# Patient Record
Sex: FEMALE | Race: WHITE | URBAN - METROPOLITAN AREA
[De-identification: names, ages, dates, MRNs, and addresses within clinical notes are randomized per-mention and may not be internally consistent; named-entity substitution may affect disease eponyms.]

---

## 2020-03-04 ENCOUNTER — HOSPITAL ENCOUNTER (OUTPATIENT)
Dept: RADIATION THERAPY | Age: 45
Discharge: HOME | End: 2020-03-04

## 2022-10-24 ENCOUNTER — APPOINTMENT (OUTPATIENT)
Dept: GENERAL RADIOLOGY | Age: 47
End: 2022-10-24
Attending: STUDENT IN AN ORGANIZED HEALTH CARE EDUCATION/TRAINING PROGRAM
Payer: COMMERCIAL

## 2022-10-24 ENCOUNTER — APPOINTMENT (OUTPATIENT)
Dept: CT IMAGING | Age: 47
End: 2022-10-24
Attending: FAMILY MEDICINE
Payer: COMMERCIAL

## 2022-10-24 ENCOUNTER — HOSPITAL ENCOUNTER (OUTPATIENT)
Age: 47
Setting detail: OBSERVATION
Discharge: LEFT BEFORE DISCHARGE | End: 2022-10-25
Attending: STUDENT IN AN ORGANIZED HEALTH CARE EDUCATION/TRAINING PROGRAM | Admitting: FAMILY MEDICINE
Payer: COMMERCIAL

## 2022-10-24 DIAGNOSIS — R55 SYNCOPE AND COLLAPSE: Primary | ICD-10-CM

## 2022-10-24 LAB
ANION GAP SERPL CALC-SCNC: 7 MMOL/L (ref 5–15)
ATRIAL RATE: 78 BPM
BASOPHILS # BLD: 0 K/UL (ref 0–0.1)
BASOPHILS NFR BLD: 0 % (ref 0–1)
BNP SERPL-MCNC: 53 PG/ML
BUN SERPL-MCNC: 30 MG/DL (ref 6–20)
BUN/CREAT SERPL: 22 (ref 12–20)
CA-I BLD-MCNC: 8.5 MG/DL (ref 8.5–10.1)
CALCULATED P AXIS, ECG09: 50 DEGREES
CALCULATED R AXIS, ECG10: -20 DEGREES
CALCULATED T AXIS, ECG11: 44 DEGREES
CHLORIDE SERPL-SCNC: 96 MMOL/L (ref 97–108)
CO2 SERPL-SCNC: 30 MMOL/L (ref 21–32)
COVID-19 RAPID TEST, COVR: NOT DETECTED
CREAT SERPL-MCNC: 1.36 MG/DL (ref 0.55–1.02)
DIAGNOSIS, 93000: NORMAL
DIFFERENTIAL METHOD BLD: ABNORMAL
EOSINOPHIL # BLD: 0.1 K/UL (ref 0–0.4)
EOSINOPHIL NFR BLD: 1 % (ref 0–7)
ERYTHROCYTE [DISTWIDTH] IN BLOOD BY AUTOMATED COUNT: 21.2 % (ref 11.5–14.5)
GLUCOSE SERPL-MCNC: 190 MG/DL (ref 65–100)
HCT VFR BLD AUTO: 31.1 % (ref 35–47)
HGB BLD-MCNC: 10 G/DL (ref 11.5–16)
IMM GRANULOCYTES # BLD AUTO: 0 K/UL
IMM GRANULOCYTES NFR BLD AUTO: 0 %
LYMPHOCYTES # BLD: 2.2 K/UL (ref 0.8–3.5)
LYMPHOCYTES NFR BLD: 25 % (ref 12–49)
MAGNESIUM SERPL-MCNC: 1.8 MG/DL (ref 1.6–2.4)
MCH RBC QN AUTO: 30.3 PG (ref 26–34)
MCHC RBC AUTO-ENTMCNC: 32.2 G/DL (ref 30–36.5)
MCV RBC AUTO: 94.2 FL (ref 80–99)
METAMYELOCYTES NFR BLD MANUAL: 2 %
MONOCYTES # BLD: 0.3 K/UL (ref 0–1)
MONOCYTES NFR BLD: 4 % (ref 5–13)
MRSA DNA SPEC QL NAA+PROBE: NOT DETECTED
NEUTS SEG # BLD: 5.8 K/UL (ref 1.8–8)
NEUTS SEG NFR BLD: 68 % (ref 32–75)
NRBC # BLD: 0 K/UL (ref 0–0.01)
NRBC BLD-RTO: 0 PER 100 WBC
P-R INTERVAL, ECG05: 148 MS
PHOSPHATE SERPL-MCNC: 4.1 MG/DL (ref 2.6–4.7)
PLATELET # BLD AUTO: 277 K/UL (ref 150–400)
PMV BLD AUTO: 10.5 FL (ref 8.9–12.9)
POTASSIUM SERPL-SCNC: 3.1 MMOL/L (ref 3.5–5.1)
Q-T INTERVAL, ECG07: 368 MS
QRS DURATION, ECG06: 82 MS
QTC CALCULATION (BEZET), ECG08: 419 MS
RBC # BLD AUTO: 3.3 M/UL (ref 3.8–5.2)
RBC MORPH BLD: ABNORMAL
SODIUM SERPL-SCNC: 133 MMOL/L (ref 136–145)
TROPONIN-HIGH SENSITIVITY: 10 NG/L (ref 0–51)
VENTRICULAR RATE, ECG03: 78 BPM
WBC # BLD AUTO: 8.6 K/UL (ref 3.6–11)

## 2022-10-24 PROCEDURE — 36415 COLL VENOUS BLD VENIPUNCTURE: CPT

## 2022-10-24 PROCEDURE — G0378 HOSPITAL OBSERVATION PER HR: HCPCS

## 2022-10-24 PROCEDURE — 93005 ELECTROCARDIOGRAM TRACING: CPT

## 2022-10-24 PROCEDURE — 90714 TD VACC NO PRESV 7 YRS+ IM: CPT | Performed by: STUDENT IN AN ORGANIZED HEALTH CARE EDUCATION/TRAINING PROGRAM

## 2022-10-24 PROCEDURE — 80048 BASIC METABOLIC PNL TOTAL CA: CPT

## 2022-10-24 PROCEDURE — 70450 CT HEAD/BRAIN W/O DYE: CPT

## 2022-10-24 PROCEDURE — 87635 SARS-COV-2 COVID-19 AMP PRB: CPT

## 2022-10-24 PROCEDURE — 96360 HYDRATION IV INFUSION INIT: CPT

## 2022-10-24 PROCEDURE — 85025 COMPLETE CBC W/AUTO DIFF WBC: CPT

## 2022-10-24 PROCEDURE — 99285 EMERGENCY DEPT VISIT HI MDM: CPT

## 2022-10-24 PROCEDURE — 90471 IMMUNIZATION ADMIN: CPT

## 2022-10-24 PROCEDURE — 74011250636 HC RX REV CODE- 250/636: Performed by: STUDENT IN AN ORGANIZED HEALTH CARE EDUCATION/TRAINING PROGRAM

## 2022-10-24 PROCEDURE — 87641 MR-STAPH DNA AMP PROBE: CPT

## 2022-10-24 PROCEDURE — 87040 BLOOD CULTURE FOR BACTERIA: CPT

## 2022-10-24 PROCEDURE — 96372 THER/PROPH/DIAG INJ SC/IM: CPT

## 2022-10-24 PROCEDURE — 65270000029 HC RM PRIVATE

## 2022-10-24 PROCEDURE — 83880 ASSAY OF NATRIURETIC PEPTIDE: CPT

## 2022-10-24 PROCEDURE — 74011250637 HC RX REV CODE- 250/637: Performed by: FAMILY MEDICINE

## 2022-10-24 PROCEDURE — 73562 X-RAY EXAM OF KNEE 3: CPT

## 2022-10-24 PROCEDURE — 84100 ASSAY OF PHOSPHORUS: CPT

## 2022-10-24 PROCEDURE — 83735 ASSAY OF MAGNESIUM: CPT

## 2022-10-24 PROCEDURE — 84484 ASSAY OF TROPONIN QUANT: CPT

## 2022-10-24 PROCEDURE — 74011250636 HC RX REV CODE- 250/636: Performed by: FAMILY MEDICINE

## 2022-10-24 RX ORDER — HEPARIN SODIUM 5000 [USP'U]/ML
5000 INJECTION, SOLUTION INTRAVENOUS; SUBCUTANEOUS EVERY 8 HOURS
Status: DISCONTINUED | OUTPATIENT
Start: 2022-10-24 | End: 2022-10-25 | Stop reason: HOSPADM

## 2022-10-24 RX ORDER — ACETAMINOPHEN 325 MG/1
650 TABLET ORAL
Status: DISCONTINUED | OUTPATIENT
Start: 2022-10-24 | End: 2022-10-25 | Stop reason: HOSPADM

## 2022-10-24 RX ORDER — ONDANSETRON 2 MG/ML
4 INJECTION INTRAMUSCULAR; INTRAVENOUS
Status: DISCONTINUED | OUTPATIENT
Start: 2022-10-24 | End: 2022-10-25 | Stop reason: HOSPADM

## 2022-10-24 RX ORDER — POLYETHYLENE GLYCOL 3350 17 G/17G
17 POWDER, FOR SOLUTION ORAL DAILY PRN
Status: DISCONTINUED | OUTPATIENT
Start: 2022-10-24 | End: 2022-10-25 | Stop reason: HOSPADM

## 2022-10-24 RX ORDER — ACETAMINOPHEN 650 MG/1
650 SUPPOSITORY RECTAL
Status: DISCONTINUED | OUTPATIENT
Start: 2022-10-24 | End: 2022-10-25 | Stop reason: HOSPADM

## 2022-10-24 RX ORDER — SODIUM CHLORIDE 9 MG/ML
75 INJECTION, SOLUTION INTRAVENOUS CONTINUOUS
Status: DISCONTINUED | OUTPATIENT
Start: 2022-10-24 | End: 2022-10-25 | Stop reason: HOSPADM

## 2022-10-24 RX ORDER — POTASSIUM CHLORIDE 750 MG/1
40 TABLET, FILM COATED, EXTENDED RELEASE ORAL
Status: COMPLETED | OUTPATIENT
Start: 2022-10-24 | End: 2022-10-24

## 2022-10-24 RX ORDER — ONDANSETRON 4 MG/1
4 TABLET, ORALLY DISINTEGRATING ORAL
Status: DISCONTINUED | OUTPATIENT
Start: 2022-10-24 | End: 2022-10-25 | Stop reason: HOSPADM

## 2022-10-24 RX ADMIN — TETANUS AND DIPHTHERIA TOXOIDS ADSORBED 0.5 ML: 2; 2 INJECTION INTRAMUSCULAR at 17:07

## 2022-10-24 RX ADMIN — SODIUM CHLORIDE 75 ML/HR: 9 INJECTION, SOLUTION INTRAVENOUS at 23:00

## 2022-10-24 RX ADMIN — SODIUM CHLORIDE 1000 ML: 9 INJECTION, SOLUTION INTRAVENOUS at 17:07

## 2022-10-24 RX ADMIN — POTASSIUM CHLORIDE 40 MEQ: 750 TABLET, FILM COATED, EXTENDED RELEASE ORAL at 22:45

## 2022-10-24 RX ADMIN — HEPARIN SODIUM 5000 UNITS: 5000 INJECTION INTRAVENOUS; SUBCUTANEOUS at 22:30

## 2022-10-24 NOTE — ED TRIAGE NOTES
Generalized weakness, recently on chemo, had syncopal episode at doctor office ending in fall, admits to loc but denies hitting head.

## 2022-10-24 NOTE — ED PROVIDER NOTES
[facility]  EMERGENCY DEPARTMENT ENCOUNTER NOTE        Date: 10/24/2022  Patient Name: Renate Turcios      History of Presenting Illness     Chief Complaint   Patient presents with    Fall    Syncope       History Provided By: Patient    HPI: Renate Turcios, 49 y.o. female with history of stage IV endometrial cancer currently receiving chemotherapy, hypertension, peripheral neuropathy, and venous stasis has been having dizziness for the last week. Today she had 2 episodes of dizziness 1 of which did not remember falling. She denies head trauma. She reports that she fell on the left knee resulting in an abrasion and pain at the knee. She is denying any chest pain, shortness of breath, nausea, vomiting, or diaphoresis. No recent illnesses. She is pain reporting that she is trying to stay hydrated and eat adequate amount of food. Presenting issue is moderate severity, known trigger, aggravating leaving factors without/additional symptoms. Currently denying any dizziness, back pain, neck pain, chest pain, abdominal pain, or musculoskeletal pain. She denies any trauma to any other part of her body aside from her left knee. There are no other complaints, changes, or physical findings at this time. PCP: Jaquelin Guadarrama MD        Past History     Past Medical History:  Past Medical History:   Diagnosis Date    Endometrial cancer ([address]     HTN (hypertension)     Peripheral neuropathy     Venous stasis        Past Surgical History:  Past Surgical History:   Procedure Laterality Date    HX CHOLECYSTECTOMY      HX DILATION AND CURETTAGE      HX HYSTERECTOMY         Family History:  History reviewed. No pertinent family history. Social History:  Social History     Tobacco Use    Smoking status: Never    Smokeless tobacco: Never   Substance Use Topics    Alcohol use: Not Currently    Drug use: Never       Allergies:   Allergies   Allergen Reactions    Keflex [Cephalexin] Other (comments)         Review of Systems     Review of Systems    A 10 point review of system was performed and was negative except as noted above in HPI    Physical Exam     Physical Exam  Vitals and nursing note reviewed. Constitutional:       General: She is not in acute distress. Appearance: She is well-developed. She is not diaphoretic. HENT:      Head: Normocephalic and atraumatic. Eyes:      Extraocular Movements: Extraocular movements intact. Conjunctiva/sclera: Conjunctivae normal.   Cardiovascular:      Rate and Rhythm: Normal rate and regular rhythm. Heart sounds: Normal heart sounds. Pulmonary:      Effort: Pulmonary effort is normal.      Breath sounds: Normal breath sounds. Abdominal:      Palpations: Abdomen is soft. Tenderness: There is no abdominal tenderness. Musculoskeletal:      Cervical back: Neck supple. Right lower leg: No tenderness. No edema. Left lower leg: No tenderness. No edema. Neurological:      General: No focal deficit present. Mental Status: She is alert and oriented to person, place, and time. Lab and Diagnostic Study Results     Labs -     Recent Results (from the past 12 hour(s))   CBC WITH AUTOMATED DIFF    Collection Time: 10/24/22  4:34 PM   Result Value Ref Range    WBC 8.6 3.6 - 11.0 K/uL    RBC 3.30 (L) 3.80 - 5.20 M/uL    HGB 10.0 (L) 11.5 - 16.0 g/dL    HCT 31.1 (L) 35.0 - 47.0 %    MCV 94.2 80.0 - 99.0 FL    MCH 30.3 26.0 - 34.0 PG    MCHC 32.2 30.0 - 36.5 g/dL    RDW 21.2 (H) 11.5 - 14.5 %    PLATELET 148 179 - 414 K/uL    MPV 10.5 8.9 - 12.9 FL    NRBC 0.0 0.0  WBC    ABSOLUTE NRBC 0.00 0.00 - 0.01 K/uL    NEUTROPHILS 68 32 - 75 %    LYMPHOCYTES 25 12 - 49 %    MONOCYTES 4 (L) 5 - 13 %    EOSINOPHILS 1 0 - 7 %    BASOPHILS 0 0 - 1 %    METAMYELOCYTES 2 (H) 0 %    IMMATURE GRANULOCYTES 0 %    ABS. NEUTROPHILS 5.8 1.8 - 8.0 K/UL    ABS. LYMPHOCYTES 2.2 0.8 - 3.5 K/UL    ABS.  MONOCYTES 0.3 0.0 - 1.0 K/UL ABS. EOSINOPHILS 0.1 0.0 - 0.4 K/UL    ABS. BASOPHILS 0.0 0.0 - 0.1 K/UL    ABS. IMM. GRANS. 0.0 K/UL    DF Manual      RBC COMMENTS Anisocytosis  1+        RBC COMMENTS Microcytosis  1+        RBC COMMENTS Spherocytes  1+       METABOLIC PANEL, BASIC    Collection Time: 10/24/22  4:34 PM   Result Value Ref Range    Sodium 133 (L) 136 - 145 mmol/L    Potassium 3.1 (L) 3.5 - 5.1 mmol/L    Chloride 96 (L) 97 - 108 mmol/L    CO2 30 21 - 32 mmol/L    Anion gap 7 5 - 15 mmol/L    Glucose 190 (H) 65 - 100 mg/dL    BUN 30 (H) 6 - 20 mg/dL    Creatinine 1.36 (H) 0.55 - 1.02 mg/dL    BUN/Creatinine ratio 22 (H) 12 - 20      eGFR 48 (L) >60 ml/min/1.73m2    Calcium 8.5 8.5 - 10.1 mg/dL   MAGNESIUM    Collection Time: 10/24/22  4:34 PM   Result Value Ref Range    Magnesium 1.8 1.6 - 2.4 mg/dL   PHOSPHORUS    Collection Time: 10/24/22  4:34 PM   Result Value Ref Range    Phosphorus 4.1 2.6 - 4.7 mg/dL   NT-PRO BNP    Collection Time: 10/24/22  4:34 PM   Result Value Ref Range    NT pro-BNP 53 <125 pg/mL   TROPONIN-HIGH SENSITIVITY    Collection Time: 10/24/22  7:30 PM   Result Value Ref Range    Troponin-High Sensitivity 10 0 - 51 ng/L       Radiologic Studies -   [unfilled]  CT Results  (Last 48 hours)      None          CXR Results  (Last 48 hours)      None            Medical Decision Making and ED Course   - I am the first and primary provider for this patient AND AM THE PRIMARY PROVIDER OF RECORD. - I reviewed the vital signs, available nursing notes, past medical history, past surgical history, family history and social history. - Initial assessment performed. The patients presenting problems have been discussed, and the staff are in agreement with the care plan formulated and outlined with them. I have encouraged them to ask questions as they arise throughout their visit. Vital Signs-Reviewed the patient's vital signs.     Patient Vitals for the past 24 hrs:   Temp Pulse Resp BP SpO2   10/24/22 1710 -- 75 18 96/64 98 %   10/24/22 1528 98.1 °F (36.7 °C) 90 19 98/61 95 %       Records Reviewed: Nursing Notes    Provider Notes (Medical Decision Making):     ED Course as of 10/24/22 2132   Mon Oct 24, 2022   1550 Patient is 48-year-old female who presents to the ED with 2 episodes of syncope. Patient reports that she had a near syncopal episode earlier when she was taking her  to his appointment. And later on developed another syncopal episode without any warning symptoms. She landed her knee and immediately came back to baseline and was awake. She denies any preceding chest pain, shortness of breath, lightheadedness, dizziness, or diaphoresis. Plan is to do an CBC, chemistry, proBNP, and get x-ray of her knee. We will additionally give 1 L of normal saline as well as tetanus booster given that she has an abrasion to her knee. [AA]   1555 KG was done at 3:36 PM entered by me as NSR with sinus arrhythmia. Rate is 78, intervals within normal, left axis deviation, no ST elevation or depression, no signs of dysrhythmia or blocks. Subtle baseline motion artifact. [AA]   [id number] Patient work-up in the ED revealed normal CBC, chemistry within normal, proBNP, phosphorus, magnesium, and troponin all within acceptable range. If her knee did not reveal any signs of fractures. I am concerned about her syncope that appears to me more of cardiogenic in origin. We will admit the patient to the hospital. [AA]      ED Course User Index  [AA] Chai Castellano MD       Diagnosis     Clinical Impression:   1. Syncope and collapse        Disposition     Disposition: Condition stable      Attestations: Reina Wu MD    Please note that this dictation was completed with "[employer] the [employer] voice recognition software. Quite often unanticipated grammatical, syntax, homophones, and other interpretive errors are inadvertently transcribed by the computer software. Please disregard these errors.   Please excuse any errors that have escaped final proofreading. Thank you.

## 2022-10-24 NOTE — Clinical Note
Patient Class[de-identified] OBSERVATION [104]   Type of Bed: Telemetry [19]   Cardiac Monitoring Required?: Yes   Reason for Observation: Syncope   Admitting Diagnosis: Syncope [787810]   Admitting Physician: Tyrell Bella [[id number]]   Attending Physician: Tyrell Bella [[id number]]

## 2022-10-25 ENCOUNTER — APPOINTMENT (OUTPATIENT)
Dept: MRI IMAGING | Age: 47
End: 2022-10-25
Attending: PSYCHIATRY & NEUROLOGY
Payer: COMMERCIAL

## 2022-10-25 VITALS
OXYGEN SATURATION: 98 % | HEART RATE: 73 BPM | HEIGHT: 62 IN | SYSTOLIC BLOOD PRESSURE: 87 MMHG | TEMPERATURE: 97.7 F | WEIGHT: 293 LBS | BODY MASS INDEX: 53.92 KG/M2 | RESPIRATION RATE: 18 BRPM | DIASTOLIC BLOOD PRESSURE: 57 MMHG

## 2022-10-25 LAB
ALBUMIN SERPL-MCNC: 3 G/DL (ref 3.5–5)
ALBUMIN/GLOB SERPL: 1.3 {RATIO} (ref 1.1–2.2)
ALP SERPL-CCNC: 72 U/L (ref 45–117)
ALT SERPL-CCNC: 29 U/L (ref 12–78)
ANION GAP SERPL CALC-SCNC: 6 MMOL/L (ref 5–15)
AST SERPL W P-5'-P-CCNC: 11 U/L (ref 15–37)
BASOPHILS # BLD: 0 K/UL (ref 0–0.1)
BASOPHILS NFR BLD: 0 % (ref 0–1)
BILIRUB SERPL-MCNC: 0.5 MG/DL (ref 0.2–1)
BUN SERPL-MCNC: 24 MG/DL (ref 6–20)
BUN/CREAT SERPL: 22 (ref 12–20)
CA-I BLD-MCNC: 8.4 MG/DL (ref 8.5–10.1)
CHLORIDE SERPL-SCNC: 101 MMOL/L (ref 97–108)
CO2 SERPL-SCNC: 29 MMOL/L (ref 21–32)
CREAT SERPL-MCNC: 1.08 MG/DL (ref 0.55–1.02)
DIFFERENTIAL METHOD BLD: ABNORMAL
EOSINOPHIL # BLD: 0 K/UL (ref 0–0.4)
EOSINOPHIL NFR BLD: 1 % (ref 0–7)
ERYTHROCYTE [DISTWIDTH] IN BLOOD BY AUTOMATED COUNT: 21.2 % (ref 11.5–14.5)
GLOBULIN SER CALC-MCNC: 2.4 G/DL (ref 2–4)
GLUCOSE SERPL-MCNC: 166 MG/DL (ref 65–100)
HCT VFR BLD AUTO: 31 % (ref 35–47)
HGB BLD-MCNC: 9.6 G/DL (ref 11.5–16)
IMM GRANULOCYTES # BLD AUTO: 0.3 K/UL (ref 0–0.04)
IMM GRANULOCYTES NFR BLD AUTO: 4 % (ref 0–0.5)
LYMPHOCYTES # BLD: 1.3 K/UL (ref 0.8–3.5)
LYMPHOCYTES NFR BLD: 23 % (ref 12–49)
MCH RBC QN AUTO: 29.4 PG (ref 26–34)
MCHC RBC AUTO-ENTMCNC: 31 G/DL (ref 30–36.5)
MCV RBC AUTO: 94.8 FL (ref 80–99)
MONOCYTES # BLD: 0.4 K/UL (ref 0–1)
MONOCYTES NFR BLD: 6 % (ref 5–13)
NEUTS SEG # BLD: 3.7 K/UL (ref 1.8–8)
NEUTS SEG NFR BLD: 66 % (ref 32–75)
NRBC # BLD: 0 K/UL (ref 0–0.01)
NRBC BLD-RTO: 0 PER 100 WBC
PLATELET # BLD AUTO: 236 K/UL (ref 150–400)
PMV BLD AUTO: 10.5 FL (ref 8.9–12.9)
POTASSIUM SERPL-SCNC: 3.4 MMOL/L (ref 3.5–5.1)
PROT SERPL-MCNC: 5.4 G/DL (ref 6.4–8.2)
RBC # BLD AUTO: 3.27 M/UL (ref 3.8–5.2)
SODIUM SERPL-SCNC: 136 MMOL/L (ref 136–145)
WBC # BLD AUTO: 5.7 K/UL (ref 3.6–11)

## 2022-10-25 PROCEDURE — G0378 HOSPITAL OBSERVATION PER HR: HCPCS

## 2022-10-25 PROCEDURE — 74011250636 HC RX REV CODE- 250/636: Performed by: FAMILY MEDICINE

## 2022-10-25 PROCEDURE — 36415 COLL VENOUS BLD VENIPUNCTURE: CPT

## 2022-10-25 PROCEDURE — 80053 COMPREHEN METABOLIC PANEL: CPT

## 2022-10-25 PROCEDURE — 85025 COMPLETE CBC W/AUTO DIFF WBC: CPT

## 2022-10-25 PROCEDURE — 96372 THER/PROPH/DIAG INJ SC/IM: CPT

## 2022-10-25 PROCEDURE — 74011250637 HC RX REV CODE- 250/637: Performed by: FAMILY MEDICINE

## 2022-10-25 RX ORDER — POTASSIUM CHLORIDE 750 MG/1
10 TABLET, FILM COATED, EXTENDED RELEASE ORAL 2 TIMES DAILY
Status: DISCONTINUED | OUTPATIENT
Start: 2022-10-25 | End: 2022-10-25 | Stop reason: HOSPADM

## 2022-10-25 RX ORDER — PANTOPRAZOLE SODIUM 40 MG/1
40 TABLET, DELAYED RELEASE ORAL DAILY
COMMUNITY

## 2022-10-25 RX ORDER — LORAZEPAM 1 MG/1
1 TABLET ORAL EVERY 8 HOURS
Status: DISCONTINUED | OUTPATIENT
Start: 2022-10-25 | End: 2022-10-25 | Stop reason: HOSPADM

## 2022-10-25 RX ORDER — DULOXETIN HYDROCHLORIDE 60 MG/1
60 CAPSULE, DELAYED RELEASE ORAL DAILY
COMMUNITY

## 2022-10-25 RX ORDER — LORAZEPAM 1 MG/1
1 TABLET ORAL
COMMUNITY

## 2022-10-25 RX ORDER — GABAPENTIN 300 MG/1
900 CAPSULE ORAL 3 TIMES DAILY
COMMUNITY

## 2022-10-25 RX ORDER — IBUPROFEN 800 MG/1
TABLET ORAL
COMMUNITY

## 2022-10-25 RX ORDER — MELATONIN
DAILY
COMMUNITY

## 2022-10-25 RX ORDER — BUTALBITAL, ACETAMINOPHEN AND CAFFEINE 50; 325; 40 MG/1; MG/1; MG/1
1 TABLET ORAL
COMMUNITY

## 2022-10-25 RX ORDER — BUMETANIDE 2 MG/1
2 TABLET ORAL DAILY
COMMUNITY

## 2022-10-25 RX ORDER — PYRIDOXINE HCL (VITAMIN B6) 100 MG
100 TABLET ORAL DAILY
COMMUNITY

## 2022-10-25 RX ORDER — LISINOPRIL 5 MG/1
5 TABLET ORAL DAILY
COMMUNITY

## 2022-10-25 RX ORDER — BACLOFEN 10 MG/1
TABLET ORAL 3 TIMES DAILY
COMMUNITY

## 2022-10-25 RX ORDER — ONDANSETRON HYDROCHLORIDE 8 MG/1
8 TABLET, FILM COATED ORAL
COMMUNITY

## 2022-10-25 RX ADMIN — SODIUM CHLORIDE 1000 ML: 9 INJECTION, SOLUTION INTRAVENOUS at 00:17

## 2022-10-25 RX ADMIN — LORAZEPAM 1 MG: 1 TABLET ORAL at 14:54

## 2022-10-25 RX ADMIN — HEPARIN SODIUM 5000 UNITS: 5000 INJECTION INTRAVENOUS; SUBCUTANEOUS at 05:39

## 2022-10-25 NOTE — CONSULTS
Cardiology Consult    NAME: Denae Delatorre   :  1975   MRN:  [id number]     Date/Time:  10/25/2022 9:50 AM    Patient PCP: Lara Verde MD  ________________________________________________________________________     Assessment:   Syncope, likely vasodepressor related to BP medications and overdiuresis  Essential hypertension  Endometrial cancer on chemotherapy x2 years  Peripheral neuropathy  Hypokalemia likely related to Bumex      Plan:   Patient is hypotensive (BP 87/42 mmHg) and appears volume depleted, would recommend stop the Lisinopril and  Bumex  Replenish potassium  Continue with hydration  Follow-up with me in the office if symptoms persist      []        High complexity decision making was performed        Subjective:   CHIEF COMPLAINT: syncope      REASON FOR CONSULT: syncope      HISTORY OF PRESENT ILLNESS:     Denae Delatorre is a 49 y.o. WHITE/NON- female who has a history of stage IV endometrial cancer currently undergoing chemotherapy. Says she has been on chemo X 2 years. She has hypertension, peripheral neuropathy related to the chemo and chronic venous stasis disease. Patient states that she was started on Bumex recently because of swelling in her legs. She also takes lisinopril for hypertension. Her last chemo treatment was on Wednesday and after that treatment she felt particularly weak, tired, and had little energy. She has been having dizzy spells now for about 2 weeks; described as a spinning and a feeling of being off balance. She was prescribed meclizine which has helped some. She went to a doctor's appointment with her  and got a dizzy spell while walking into the doctor's office. She fell in the parking lot but did not lose consciousness. Went home and again while trying to walk into her house she felt dizzy, lightheaded, and fell. She had LOC for probably a second or 2. No incontinence, no tongue biting, no witnessed seizure activity. No chest pain, no palpitations. No history of cardiac disease. She feels back to baseline now. Past Medical History:   Diagnosis Date    Endometrial cancer ([address]     HTN (hypertension)     Peripheral neuropathy     Venous stasis       Past Surgical History:   Procedure Laterality Date    HX CHOLECYSTECTOMY      HX DILATION AND CURETTAGE      HX HYSTERECTOMY       Allergies   Allergen Reactions    Keflex [Cephalexin] Other (comments)      Meds:  See below  Social History     Tobacco Use    Smoking status: Never    Smokeless tobacco: Never   Substance Use Topics    Alcohol use: Not Currently      History reviewed. No pertinent family history. REVIEW OF SYSTEMS:     []         Unable to obtain  ROS due to ---   [x]         Total of 12 systems reviewed as follows:    Constitutional: negative fever, negative chills, negative weight loss  Eyes:   negative visual changes  ENT:   negative sore throat, tongue or lip swelling  Respiratory:  negative cough, negative dyspnea  Cards:  negative for chest pain, palpitations, lower extremity edema  GI:   negative for nausea, vomiting, diarrhea, and abdominal pain  Genitourinary: negative for frequency, dysuria  Integument:  negative for rash   Hematologic:  negative for easy bruising and gum/nose bleeding  Musculoskel: negative for myalgias,  back pain  Neurological:  +dizziness, vertigo, weakness  Behavl/Psych: negative for feelings of anxiety, depression     Pertinent Positives include :    Objective:      Physical Exam:    Last 24hrs VS reviewed since prior progress note.  Most recent are:    Visit Vitals  BP (!) 87/57 (BP 1 Location: Right upper arm, BP Patient Position: Lying)   Pulse 79   Temp 97.7 °F (36.5 °C)   Resp 18   Ht 5' 2\" (1.575 m)   Wt 147 kg (324 lb)   SpO2 98%   Breastfeeding No   BMI 59.26 kg/m²       Intake/Output Summary (Last 24 hours) at 10/25/2022 0950  Last data filed at 10/24/2022 1807  Gross per 24 hour   Intake 1000 ml   Output --   Net 1000 ml        General Appearance: Well developed, alert, no acute distress. Ears/Nose/Mouth/Throat: Moist mucosa  Neck: Supple. JVP within normal limits. Carotids good upstrokes  Chest: Lungs clear to auscultation bilaterally. Cardiovascular: Regular rate and rhythm, S1S2 normal, soft systolic murmur  Abdomen: Soft, non-tender, bowel sounds are active. Extremities: No edema bilaterally. distal pulses +1. Skin: Warm and dry. Neuro: Alert and oriented x3, normal speech; follows simple commands  Psychiatric: Cooperative; appropriate    []         Post-cath site without hematoma, bruit, tenderness, or thrill. Distal pulses intact. Data:      Telemetry:  NSR    EKG: NSR with PRWP  []  No new EKG for review. Prior to Admission medications    Medication Sig Start Date End Date Taking? Authorizing Provider   bumetanide (BUMEX) 2 mg tablet Take 2 mg by mouth daily. Yes Provider, Historical   baclofen (LIORESAL) 10 mg tablet Take  by mouth three (3) times daily. Yes Provider, Historical   gabapentin (NEURONTIN) 300 mg capsule Take 900 mg by mouth three (3) times daily. Yes Provider, Historical   lisinopriL (PRINIVIL, ZESTRIL) 5 mg tablet Take 5 mg by mouth daily. Yes Provider, Historical   LORazepam (Ativan) 1 mg tablet Take 1 mg by mouth every eight (8) hours as needed for Anxiety. Yes Provider, Historical   pantoprazole (Protonix) 40 mg tablet Take 40 mg by mouth daily. Yes Provider, Historical   cholecalciferol (Vitamin D3) (1000 Units /25 mcg) tablet Take  by mouth daily. Yes Provider, Historical   pyridoxine, vitamin B6, (Vitamin B-6) 100 mg tablet Take 100 mg by mouth daily. Yes Provider, Historical   DULoxetine (CYMBALTA) 60 mg capsule Take 60 mg by mouth daily. Yes Provider, Historical   ibuprofen (MOTRIN) 800 mg tablet Take  by mouth. Yes Provider, Historical   ondansetron hcl (Zofran) 8 mg tablet Take 8 mg by mouth every eight (8) hours as needed for Nausea or Vomiting.    Yes Provider, Historical   butalbital-acetaminophen-caffeine (FIORICET, ESGIC) -40 mg per tablet Take 1 Tablet by mouth every four (4) hours as needed for Headache. Provider, Historical       Recent Results (from the past 24 hour(s))   EKG, 12 LEAD, INITIAL    Collection Time: 10/24/22  3:36 PM   Result Value Ref Range    Ventricular Rate 78 BPM    Atrial Rate 78 BPM    P-R Interval 148 ms    QRS Duration 82 ms    Q-T Interval 368 ms    QTC Calculation (Bezet) 419 ms    Calculated P Axis 50 degrees    Calculated R Axis -20 degrees    Calculated T Axis 44 degrees    Diagnosis       Normal sinus rhythm with sinus arrhythmia  Possible Anterior infarct , age undetermined  Abnormal ECG  No previous ECGs available  Confirmed by Sandra Ely ([id number]) on 10/24/2022 9:42:51 PM     CBC WITH AUTOMATED DIFF    Collection Time: 10/24/22  4:34 PM   Result Value Ref Range    WBC 8.6 3.6 - 11.0 K/uL    RBC 3.30 (L) 3.80 - 5.20 M/uL    HGB 10.0 (L) 11.5 - 16.0 g/dL    HCT 31.1 (L) 35.0 - 47.0 %    MCV 94.2 80.0 - 99.0 FL    MCH 30.3 26.0 - 34.0 PG    MCHC 32.2 30.0 - 36.5 g/dL    RDW 21.2 (H) 11.5 - 14.5 %    PLATELET 190 608 - 516 K/uL    MPV 10.5 8.9 - 12.9 FL    NRBC 0.0 0.0  WBC    ABSOLUTE NRBC 0.00 0.00 - 0.01 K/uL    NEUTROPHILS 68 32 - 75 %    LYMPHOCYTES 25 12 - 49 %    MONOCYTES 4 (L) 5 - 13 %    EOSINOPHILS 1 0 - 7 %    BASOPHILS 0 0 - 1 %    METAMYELOCYTES 2 (H) 0 %    IMMATURE GRANULOCYTES 0 %    ABS. NEUTROPHILS 5.8 1.8 - 8.0 K/UL    ABS. LYMPHOCYTES 2.2 0.8 - 3.5 K/UL    ABS. MONOCYTES 0.3 0.0 - 1.0 K/UL    ABS. EOSINOPHILS 0.1 0.0 - 0.4 K/UL    ABS. BASOPHILS 0.0 0.0 - 0.1 K/UL    ABS. IMM.  GRANS. 0.0 K/UL    DF Manual      RBC COMMENTS Anisocytosis  1+        RBC COMMENTS Microcytosis  1+        RBC COMMENTS Spherocytes  1+       METABOLIC PANEL, BASIC    Collection Time: 10/24/22  4:34 PM   Result Value Ref Range    Sodium 133 (L) 136 - 145 mmol/L    Potassium 3.1 (L) 3.5 - 5.1 mmol/L    Chloride 96 (L) 97 - 108 mmol/L    CO2 30 21 - 32 mmol/L    Anion gap 7 5 - 15 mmol/L    Glucose 190 (H) 65 - 100 mg/dL    BUN 30 (H) 6 - 20 mg/dL    Creatinine 1.36 (H) 0.55 - 1.02 mg/dL    BUN/Creatinine ratio 22 (H) 12 - 20      eGFR 48 (L) >60 ml/min/1.73m2    Calcium 8.5 8.5 - 10.1 mg/dL   MAGNESIUM    Collection Time: 10/24/22  4:34 PM   Result Value Ref Range    Magnesium 1.8 1.6 - 2.4 mg/dL   PHOSPHORUS    Collection Time: 10/24/22  4:34 PM   Result Value Ref Range    Phosphorus 4.1 2.6 - 4.7 mg/dL   NT-PRO BNP    Collection Time: 10/24/22  4:34 PM   Result Value Ref Range    NT pro-BNP 53 <125 pg/mL   TROPONIN-HIGH SENSITIVITY    Collection Time: 10/24/22  7:30 PM   Result Value Ref Range    Troponin-High Sensitivity 10 0 - 51 ng/L   MRSA SCREEN - PCR (NASAL)    Collection Time: 10/24/22  8:48 PM   Result Value Ref Range    MRSA by PCR, Nasal Not Detected Not Detected     COVID-19 RAPID TEST    Collection Time: 10/24/22  8:48 PM   Result Value Ref Range    COVID-19 rapid test Not Detected Not Deyanne Phoenix, MD

## 2022-10-25 NOTE — CONSULTS
NEURO CONSULT      REASON FOR ADMISSION:  Dizziness      HISTORY:  Amy Abreu is 49years old with history of stage IV endometrial carcinoma currently receiving chemotherapy, history of hypertension, history of peripheral neuropathy, history of venous stasis, was consulted to neurology for dizziness. According to the patient patient has had intermittent dizziness and has able \"passed out\" very briefly. It was thought that the patient was dehydrated and was treated for that in the past.  Patient came to the ER stabilized and admitted.           ROS: As per above    General:                     No fever, no chills, no sweats, no generalized weakness, no weight loss/gain,                                       No loss of appetite   Eyes:                           No blurred vision, no eye pain, no loss of vision, no double vision  ENT:                            rhinorrhea, no pharyngitis   Respiratory:               No cough, no sputum production, no SOB, no CARL, no wheezing, no pleuritic pain   Cardiology:                No chest pain, no palpitations, no orthopnea, no PND, no edema, no syncope   Gastrointestinal:       No abdominal pain , no N/V, no diarrhea, no dysphagia, no constipation, no bleeding   Genitourinary:           frequency, no urgency, no dysuria, no hematuria, no incontinence   Muskuloskeletal :      No arthralgia, no myalgia, no back pain  Hematology:              No easy bruising, no nose or gum bleeding, no lymphadenopathy   Dermatological:         No rash, no ulceration, no pruritis, no color change / jaundice  Endocrine:                 hot flashes or polydipsia   Neurological:             No headache, no dizziness, no confusion, no focal weakness, no paresthesia,                                      No Speech difficulties, no memory loss, no gait difficulty  Psychological:          No neelings of anxiety, no depression, no agitation      NEURO EXAM:    Mental status: Awake, alert, oriented, in pleasant mood. Holds an adequate conversation. Cranial nerves: Cranial nerves intact    Motor exam: Motor exam intact    Sensory exam: There is no tactile extinction. Romberg was not tested    Coordination: Finger-to-nose is intact. Heel-to-shin is fair    Gait and Station: Patient was not ambulated    ASSESSMENT:  Patient's dizziness may be multifactorial.  Dehydration needs to be ruled out. Toxicity from long-term chemotherapy for more than a year and needs to be considered. Cardiogenic etiology needs to be considered. Possible autonomic dysregulation secondary to chemotherapy needs to be considered  Doubt seizure      PLAN:  MRI of the brain, any possibility of brain mets also needs to be ruled out  EEG  Carotid duplex  Cardiac echo  PT/OT  Seizure precautions  A1c  Lipid profile  High complexity case.   Total amount of time taken 40 minutes      ALLERGIES:    Allergies   Allergen Reactions    Keflex [Cephalexin] Other (comments)       MEDS:      Current Facility-Administered Medications:     0.9% sodium chloride infusion, 75 mL/hr, IntraVENous, CONTINUOUS, Valeriy Padilla MD, Last Rate: 75 mL/hr at 10/24/22 2300, 75 mL/hr at 10/24/22 2300    acetaminophen (TYLENOL) tablet 650 mg, 650 mg, Oral, Q6H PRN **OR** acetaminophen (TYLENOL) suppository 650 mg, 650 mg, Rectal, Q6H PRN, Valeriy Padilla MD    polyethylene glycol (MIRALAX) packet 17 g, 17 g, Oral, DAILY PRN, Valeriy Padilla MD    ondansetron (ZOFRAN ODT) tablet 4 mg, 4 mg, Oral, Q8H PRN **OR** ondansetron (ZOFRAN) injection 4 mg, 4 mg, IntraVENous, Q6H PRN, Valeriy Padilla MD    heparin (porcine) injection 5,000 Units, 5,000 Units, SubCUTAneous, Q8H, Valeriy Padilla MD, 5,000 Units at 10/25/22 0539    LABS:  Recent Results (from the past 24 hour(s))   EKG, 12 LEAD, INITIAL    Collection Time: 10/24/22  3:36 PM   Result Value Ref Range    Ventricular Rate 78 BPM    Atrial Rate 78 BPM    P-R Interval 148 ms    QRS Duration 82 ms    Q-T Interval 368 ms    QTC Calculation (Bezet) 419 ms    Calculated P Axis 50 degrees    Calculated R Axis -20 degrees    Calculated T Axis 44 degrees    Diagnosis       Normal sinus rhythm with sinus arrhythmia  Possible Anterior infarct , age undetermined  Abnormal ECG  No previous ECGs available  Confirmed by Moe Mchugh ([id number]) on 10/24/2022 9:42:51 PM     CBC WITH AUTOMATED DIFF    Collection Time: 10/24/22  4:34 PM   Result Value Ref Range    WBC 8.6 3.6 - 11.0 K/uL    RBC 3.30 (L) 3.80 - 5.20 M/uL    HGB 10.0 (L) 11.5 - 16.0 g/dL    HCT 31.1 (L) 35.0 - 47.0 %    MCV 94.2 80.0 - 99.0 FL    MCH 30.3 26.0 - 34.0 PG    MCHC 32.2 30.0 - 36.5 g/dL    RDW 21.2 (H) 11.5 - 14.5 %    PLATELET 376 842 - 816 K/uL    MPV 10.5 8.9 - 12.9 FL    NRBC 0.0 0.0  WBC    ABSOLUTE NRBC 0.00 0.00 - 0.01 K/uL    NEUTROPHILS 68 32 - 75 %    LYMPHOCYTES 25 12 - 49 %    MONOCYTES 4 (L) 5 - 13 %    EOSINOPHILS 1 0 - 7 %    BASOPHILS 0 0 - 1 %    METAMYELOCYTES 2 (H) 0 %    IMMATURE GRANULOCYTES 0 %    ABS. NEUTROPHILS 5.8 1.8 - 8.0 K/UL    ABS. LYMPHOCYTES 2.2 0.8 - 3.5 K/UL    ABS. MONOCYTES 0.3 0.0 - 1.0 K/UL    ABS. EOSINOPHILS 0.1 0.0 - 0.4 K/UL    ABS. BASOPHILS 0.0 0.0 - 0.1 K/UL    ABS. IMM.  GRANS. 0.0 K/UL    DF Manual      RBC COMMENTS Anisocytosis  1+        RBC COMMENTS Microcytosis  1+        RBC COMMENTS Spherocytes  1+       METABOLIC PANEL, BASIC    Collection Time: 10/24/22  4:34 PM   Result Value Ref Range    Sodium 133 (L) 136 - 145 mmol/L    Potassium 3.1 (L) 3.5 - 5.1 mmol/L    Chloride 96 (L) 97 - 108 mmol/L    CO2 30 21 - 32 mmol/L    Anion gap 7 5 - 15 mmol/L    Glucose 190 (H) 65 - 100 mg/dL    BUN 30 (H) 6 - 20 mg/dL    Creatinine 1.36 (H) 0.55 - 1.02 mg/dL    BUN/Creatinine ratio 22 (H) 12 - 20      eGFR 48 (L) >60 ml/min/1.73m2    Calcium 8.5 8.5 - 10.1 mg/dL   MAGNESIUM    Collection Time: 10/24/22  4:34 PM   Result Value Ref Range    Magnesium 1.8 1.6 - 2.4 mg/dL   PHOSPHORUS    Collection Time: 10/24/22  4:34 PM   Result Value Ref Range    Phosphorus 4.1 2.6 - 4.7 mg/dL   NT-PRO BNP    Collection Time: 10/24/22  4:34 PM   Result Value Ref Range    NT pro-BNP 53 <125 pg/mL   TROPONIN-HIGH SENSITIVITY    Collection Time: 10/24/22  7:30 PM   Result Value Ref Range    Troponin-High Sensitivity 10 0 - 51 ng/L   MRSA SCREEN - PCR (NASAL)    Collection Time: 10/24/22  8:48 PM   Result Value Ref Range    MRSA by PCR, Nasal Not Detected Not Detected     COVID-19 RAPID TEST    Collection Time: 10/24/22  8:48 PM   Result Value Ref Range    COVID-19 rapid test Not Detected Not Detected         Visit Vitals  BP (!) 87/57 (BP 1 Location: Right upper arm, BP Patient Position: Lying)   Pulse 79   Temp 97.7 °F (36.5 °C)   Resp 18   Ht 5' 2\" (1.575 m)   Wt 147 kg (324 lb)   SpO2 98%   Breastfeeding No   BMI 59.26 kg/m²       Imaging:  CT HEAD WO CONT   Final Result   No acute intracranial finding. XR KNEE LT 3 V   Final Result      No fracture.

## 2022-10-25 NOTE — ROUTINE PROCESS
Bedside shift change report given to Reina Garcia (oncoming nurse) by Lito David (offgoing nurse). Report included the following information SBAR, MAR, and Recent Results.

## 2022-10-25 NOTE — PROGRESS NOTES
PROGRESS NOTE    Patient: Lynda Zee MRN: [id number]  SSN: [id number]    YOB: 1975  Age: 49 y.o. Sex: female      Admit Date: 10/24/2022    LOS: 1 day       Subjective     Chief Complaint   Patient presents with    Fall    Syncope       HPI:   Patient is a 49y.o. year old female history of hypertension history of endometrial carcinoma diagnosed in 2020 after multiple surgeries and chemotherapy  Last chemo was on Wednesday since then patient feeling very dizzy and today she passed out twice came to emergency room seen by the ER physician  ER physician tenderness most likely cardiac event patient was recommended to be admitted for further ration treatment and I saw the patient patient was very hypotensive systolic blood pressure was 80     Patient is alert awake denies any chest pain shortness of nausea vomiting according to patient before she passed out she felt dizzy and then she blacked out denies any chest pain diaphoresis palpitation nausea vomiting diarrhea constipation    10/25  Low BP at [id number] cardiology and neurology consults  Awaiting blood cx results  MRSA negative  Labs from 10/24:  Hgb low at 10  Sodium low at 133  Potassium low at 3.1  Chloride low at 96  Glucose elevated at 190  Creatinine elevated at 1.36  Pt is alert, awake, no acute distress. Pt states she is feeling tired with some weakness, but overall feels \"okay. \" She states she started a fluid pill last week for leg swelling, unsure of name at this time. Denies any dizziness, lightheadedness, chest pain or SOB. Denies N/V/D, constipation, abdominal pain, fever or chills.      Objective     Visit Vitals  BP (!) 87/57 (BP 1 Location: Right upper arm, BP Patient Position: Lying)   Pulse 87   Temp 97.7 °F (36.5 °C)   Resp 18   Ht 5' 2\" (1.575 m)   Wt 147 kg (324 lb)   SpO2 98%   Breastfeeding No   BMI 59.26 kg/m²      O2 Device: None (Room air)    Physical Exam:   Constitutional: pt is oriented to person, place, and time.   HENT:   Head: Normocephalic and atraumatic. Eyes: Pupils are equal, round, and reactive to light. EOM are normal.   Cardiovascular: Normal rate, regular rhythm and normal heart sounds. Pulmonary/Chest: Breath sounds normal. No wheezes. No rales. Exhibits no tenderness. Abdominal: Soft. Bowel sounds are normal. There is no abdominal tenderness. There is no rebound and no guarding. Musculoskeletal: Normal range of motion. B/L LE edema  Neurological: pt is alert and oriented to person, place, and time. Alert. Normal strength. No cranial nerve deficit or sensory deficit. Displays a negative Romberg sign. Intake & Output:  Current Shift: No intake/output data recorded.   Last three shifts: 10/23 1901 - 10/25 0700  In: 1000 [I.V.:1000]  Out: -       24 Hour Results:    Recent Results (from the past 24 hour(s))   EKG, 12 LEAD, INITIAL    Collection Time: 10/24/22  3:36 PM   Result Value Ref Range    Ventricular Rate 78 BPM    Atrial Rate 78 BPM    P-R Interval 148 ms    QRS Duration 82 ms    Q-T Interval 368 ms    QTC Calculation (Bezet) 419 ms    Calculated P Axis 50 degrees    Calculated R Axis -20 degrees    Calculated T Axis 44 degrees    Diagnosis       Normal sinus rhythm with sinus arrhythmia  Possible Anterior infarct , age undetermined  Abnormal ECG  No previous ECGs available  Confirmed by Mahnaz Chaparro ([id number]) on 10/24/2022 9:42:51 PM     CBC WITH AUTOMATED DIFF    Collection Time: 10/24/22  4:34 PM   Result Value Ref Range    WBC 8.6 3.6 - 11.0 K/uL    RBC 3.30 (L) 3.80 - 5.20 M/uL    HGB 10.0 (L) 11.5 - 16.0 g/dL    HCT 31.1 (L) 35.0 - 47.0 %    MCV 94.2 80.0 - 99.0 FL    MCH 30.3 26.0 - 34.0 PG    MCHC 32.2 30.0 - 36.5 g/dL    RDW 21.2 (H) 11.5 - 14.5 %    PLATELET 846 584 - 886 K/uL    MPV 10.5 8.9 - 12.9 FL    NRBC 0.0 0.0  WBC    ABSOLUTE NRBC 0.00 0.00 - 0.01 K/uL    NEUTROPHILS 68 32 - 75 %    LYMPHOCYTES 25 12 - 49 %    MONOCYTES 4 (L) 5 - 13 %    EOSINOPHILS 1 0 - 7 % BASOPHILS 0 0 - 1 %    METAMYELOCYTES 2 (H) 0 %    IMMATURE GRANULOCYTES 0 %    ABS. NEUTROPHILS 5.8 1.8 - 8.0 K/UL    ABS. LYMPHOCYTES 2.2 0.8 - 3.5 K/UL    ABS. MONOCYTES 0.3 0.0 - 1.0 K/UL    ABS. EOSINOPHILS 0.1 0.0 - 0.4 K/UL    ABS. BASOPHILS 0.0 0.0 - 0.1 K/UL    ABS. IMM. GRANS. 0.0 K/UL    DF Manual      RBC COMMENTS Anisocytosis  1+        RBC COMMENTS Microcytosis  1+        RBC COMMENTS Spherocytes  1+       METABOLIC PANEL, BASIC    Collection Time: 10/24/22  4:34 PM   Result Value Ref Range    Sodium 133 (L) 136 - 145 mmol/L    Potassium 3.1 (L) 3.5 - 5.1 mmol/L    Chloride 96 (L) 97 - 108 mmol/L    CO2 30 21 - 32 mmol/L    Anion gap 7 5 - 15 mmol/L    Glucose 190 (H) 65 - 100 mg/dL    BUN 30 (H) 6 - 20 mg/dL    Creatinine 1.36 (H) 0.55 - 1.02 mg/dL    BUN/Creatinine ratio 22 (H) 12 - 20      eGFR 48 (L) >60 ml/min/1.73m2    Calcium 8.5 8.5 - 10.1 mg/dL   MAGNESIUM    Collection Time: 10/24/22  4:34 PM   Result Value Ref Range    Magnesium 1.8 1.6 - 2.4 mg/dL   PHOSPHORUS    Collection Time: 10/24/22  4:34 PM   Result Value Ref Range    Phosphorus 4.1 2.6 - 4.7 mg/dL   NT-PRO BNP    Collection Time: 10/24/22  4:34 PM   Result Value Ref Range    NT pro-BNP 53 <125 pg/mL   TROPONIN-HIGH SENSITIVITY    Collection Time: 10/24/22  7:30 PM   Result Value Ref Range    Troponin-High Sensitivity 10 0 - 51 ng/L   MRSA SCREEN - PCR (NASAL)    Collection Time: 10/24/22  8:48 PM   Result Value Ref Range    MRSA by PCR, Nasal Not Detected Not Detected     COVID-19 RAPID TEST    Collection Time: 10/24/22  8:48 PM   Result Value Ref Range    COVID-19 rapid test Not Detected Not Detected           Imaging:    CT HEAD WO CONT   Final Result   No acute intracranial finding. XR KNEE LT 3 V   Final Result      No fracture.              Assessment     Syncopal episode  Dehydration  Acute kidney injury  Endometrial carcinoma  Hypokalemia  Hyponatremia  Hypotension  B/L lower extremity edema    Plan     Repleat sodium and potassium  Increase IVF  Awaiting cardiology and neurology consults  Awaiting blood cx results      Current Facility-Administered Medications:     0.9% sodium chloride infusion, 75 mL/hr, IntraVENous, CONTINUOUS, Valeriy Padilla MD, Last Rate: 75 mL/hr at 10/24/22 2300, 75 mL/hr at 10/24/22 2300    acetaminophen (TYLENOL) tablet 650 mg, 650 mg, Oral, Q6H PRN **OR** acetaminophen (TYLENOL) suppository 650 mg, 650 mg, Rectal, Q6H PRN, Valeriy Padilla MD    polyethylene glycol (MIRALAX) packet 17 g, 17 g, Oral, DAILY PRN, Valeriy Padilla MD    ondansetron (ZOFRAN ODT) tablet 4 mg, 4 mg, Oral, Q8H PRN **OR** ondansetron (ZOFRAN) injection 4 mg, 4 mg, IntraVENous, Q6H PRN, Valeriy Padilla MD    heparin (porcine) injection 5,000 Units, 5,000 Units, SubCUTAneous, Q8H, Valeriy Padilla MD, 5,000 Units at 10/25/22 4391    Current Outpatient Medications   Medication Instructions    baclofen (LIORESAL) 10 mg tablet Oral, 3 TIMES DAILY    bumetanide (BUMEX) 2 mg, Oral, DAILY    butalbital-acetaminophen-caffeine (FIORICET, ESGIC) -40 mg per tablet 1 Tablet, Oral, EVERY 4 HOURS AS NEEDED    cholecalciferol (Vitamin D3) (1000 Units /25 mcg) tablet Oral, DAILY    DULoxetine (CYMBALTA) 60 mg, Oral, DAILY    gabapentin (NEURONTIN) 900 mg, Oral, 3 TIMES DAILY    ibuprofen (MOTRIN) 800 mg tablet Oral    lisinopriL (PRINIVIL, ZESTRIL) 5 mg, Oral, DAILY    LORazepam (ATIVAN) 1 mg, Oral, EVERY 8 HOURS AS NEEDED    ondansetron hcl (ZOFRAN) 8 mg, Oral, EVERY 8 HOURS AS NEEDED    pantoprazole (PROTONIX) 40 mg, Oral, DAILY    pyridoxine (vitamin B6) (VITAMIN B-6) 100 mg, Oral, DAILY         Signed By: Jennifer Naqvi     October 25, 2022

## 2022-10-25 NOTE — H&P
History and Physical    NAME: Brent Manley   :  1975   MRN:  [id number]     Date/Time:  10/24/2022 9:00 PM    Patient PCP: Jorden Reilly MD  ______________________________________________________________________             Subjective:     CHIEF COMPLAINT:     Couple episode        HISTORY OF PRESENT ILLNESS:       Patient is a 49y.o. year old female history of hypertension history of endometrial carcinoma diagnosed in  after multiple surgeries and chemotherapy  Last chemo was on Wednesday since then patient feeling very dizzy and today she passed out twice came to emergency room seen by the ER physician  ER physician tenderness most likely cardiac event patient was recommended to be admitted for further ration treatment and I saw the patient patient was very hypotensive systolic blood pressure was 80    Patient is alert awake denies any chest pain shortness of nausea vomiting according to patient before she passed out she felt dizzy and then she blacked out denies any chest pain diaphoresis palpitation nausea vomiting diarrhea constipation    Past Medical History:   Diagnosis Date    Endometrial cancer ([facility] [address]     HTN (hypertension)     Peripheral neuropathy     Venous stasis         Past Surgical History:   Procedure Laterality Date    HX CHOLECYSTECTOMY      HX DILATION AND CURETTAGE      HX HYSTERECTOMY         Social History     Tobacco Use    Smoking status: Never    Smokeless tobacco: Never   Substance Use Topics    Alcohol use: Not Currently        History reviewed. No pertinent family history.     Allergies   Allergen Reactions    Keflex [Cephalexin] Other (comments)        Prior to Admission medications    Not on File         Current Facility-Administered Medications:     0.9% sodium chloride infusion, 75 mL/hr, IntraVENous, CONTINUOUS, Valeriy Padilla MD    acetaminophen (TYLENOL) tablet 650 mg, 650 mg, Oral, Q6H PRN **OR** acetaminophen (TYLENOL) suppository 650 mg, 650 mg, Rectal, Q6H PRN, Oumou Kwan MD    polyethylene glycol (MIRALAX) packet 17 g, 17 g, Oral, DAILY PRN, Valeriy Padilla MD    ondansetron (ZOFRAN ODT) tablet 4 mg, 4 mg, Oral, Q8H PRN **OR** ondansetron (ZOFRAN) injection 4 mg, 4 mg, IntraVENous, Q6H PRN, Valeriy Padilla MD    heparin (porcine) injection 5,000 Units, 5,000 Units, SubCUTAneous, Q8H, Valeriy Padilla MD  No current outpatient medications on file. LAB DATA REVIEWED:    Recent Results (from the past 24 hour(s))   CBC WITH AUTOMATED DIFF    Collection Time: 10/24/22  4:34 PM   Result Value Ref Range    WBC 8.6 3.6 - 11.0 K/uL    RBC 3.30 (L) 3.80 - 5.20 M/uL    HGB 10.0 (L) 11.5 - 16.0 g/dL    HCT 31.1 (L) 35.0 - 47.0 %    MCV 94.2 80.0 - 99.0 FL    MCH 30.3 26.0 - 34.0 PG    MCHC 32.2 30.0 - 36.5 g/dL    RDW 21.2 (H) 11.5 - 14.5 %    PLATELET 939 264 - 570 K/uL    MPV 10.5 8.9 - 12.9 FL    NRBC 0.0 0.0  WBC    ABSOLUTE NRBC 0.00 0.00 - 0.01 K/uL    NEUTROPHILS 68 32 - 75 %    LYMPHOCYTES 25 12 - 49 %    MONOCYTES 4 (L) 5 - 13 %    EOSINOPHILS 1 0 - 7 %    BASOPHILS 0 0 - 1 %    METAMYELOCYTES 2 (H) 0 %    IMMATURE GRANULOCYTES 0 %    ABS. NEUTROPHILS 5.8 1.8 - 8.0 K/UL    ABS. LYMPHOCYTES 2.2 0.8 - 3.5 K/UL    ABS. MONOCYTES 0.3 0.0 - 1.0 K/UL    ABS. EOSINOPHILS 0.1 0.0 - 0.4 K/UL    ABS. BASOPHILS 0.0 0.0 - 0.1 K/UL    ABS. IMM.  GRANS. 0.0 K/UL    DF Manual      RBC COMMENTS Anisocytosis  1+        RBC COMMENTS Microcytosis  1+        RBC COMMENTS Spherocytes  1+       METABOLIC PANEL, BASIC    Collection Time: 10/24/22  4:34 PM   Result Value Ref Range    Sodium 133 (L) 136 - 145 mmol/L    Potassium 3.1 (L) 3.5 - 5.1 mmol/L    Chloride 96 (L) 97 - 108 mmol/L    CO2 30 21 - 32 mmol/L    Anion gap 7 5 - 15 mmol/L    Glucose 190 (H) 65 - 100 mg/dL    BUN 30 (H) 6 - 20 mg/dL    Creatinine 1.36 (H) 0.55 - 1.02 mg/dL    BUN/Creatinine ratio 22 (H) 12 - 20      eGFR 48 (L) >60 ml/min/1.73m2    Calcium 8.5 8.5 - 10.1 mg/dL   MAGNESIUM    Collection Time: 10/24/22  4:34 PM   Result Value Ref Range    Magnesium 1.8 1.6 - 2.4 mg/dL   PHOSPHORUS    Collection Time: 10/24/22  4:34 PM   Result Value Ref Range    Phosphorus 4.1 2.6 - 4.7 mg/dL   NT-PRO BNP    Collection Time: 10/24/22  4:34 PM   Result Value Ref Range    NT pro-BNP 53 <125 pg/mL   TROPONIN-HIGH SENSITIVITY    Collection Time: 10/24/22  7:30 PM   Result Value Ref Range    Troponin-High Sensitivity 10 0 - 51 ng/L       XR Results (most recent):  Results from Hospital Encounter encounter on 10/24/22    XR KNEE LT 3 V    Narrative  EXAM: XR KNEE LT 3 V    INDICATION: Fall, left knee pain. COMPARISON: None. FINDINGS: Three views of the left knee demonstrate no fracture or other acute  osseous or articular abnormality. There is no effusion. Bones are osteopenic. Mild patellofemoral and medial compartment osteoarthritis. No erosion or  chondrocalcinosis. Impression  No fracture. XR KNEE LT 3 V   Final Result      No fracture. CT HEAD WO CONT    (Results Pending)        Review of Systems:  Constitutional: Negative for chills and fever. HENT: Negative. Eyes: Negative. Respiratory: Negative. Cardiovascular: Negative. Gastrointestinal: Negative for abdominal pain and nausea. Skin: Negative. Neurological: Negative. Objective:   VITALS:    Visit Vitals  BP 96/64 (BP 1 Location: Left lower arm, BP Patient Position: At rest)   Pulse 75   Temp 98.1 °F (36.7 °C)   Resp 18   Ht 5' 2\" (1.575 m)   Wt 147 kg (324 lb)   SpO2 98%   BMI 59.26 kg/m²       Physical Exam:   Constitutional: pt is oriented to person, place, and time. HENT:   Head: Normocephalic and atraumatic. Eyes: Pupils are equal, round, and reactive to light. EOM are normal.   Cardiovascular: Normal rate, regular rhythm and normal heart sounds. Pulmonary/Chest: Breath sounds normal. No wheezes. No rales. Exhibits no tenderness. Abdominal: Soft. Bowel sounds are normal. There is no abdominal tenderness. There is no rebound and no guarding. Musculoskeletal: Normal range of motion. Neurological: pt is alert and oriented to person, place, and time. Alert. Normal strength. No cranial nerve deficit or sensory deficit. Displays a negative Romberg sign. ASSESSMENT & PLAN:    Syncopal episode  Dehydration  Acute kidney injury  Endometrial carcinoma  Hypokalemia  Hyponatremia      Current Facility-Administered Medications:     0.9% sodium chloride infusion, 75 mL/hr, IntraVENous, CONTINUOUS, Valeriy Padilla MD    acetaminophen (TYLENOL) tablet 650 mg, 650 mg, Oral, Q6H PRN **OR** acetaminophen (TYLENOL) suppository 650 mg, 650 mg, Rectal, Q6H PRN, Valeriy Padilla MD    polyethylene glycol (MIRALAX) packet 17 g, 17 g, Oral, DAILY PRN, Valeriy Padilla MD    ondansetron (ZOFRAN ODT) tablet 4 mg, 4 mg, Oral, Q8H PRN **OR** ondansetron (ZOFRAN) injection 4 mg, 4 mg, IntraVENous, Q6H PRN, Valeriy Padilla MD    heparin (porcine) injection 5,000 Units, 5,000 Units, SubCUTAneous, Q8H, Valeriy Padilla MD    sodium chloride 0.9 % bolus infusion 1,000 mL, 1,000 mL, IntraVENous, ONCE, Hanane Padilla MD  No current outpatient medications on file.      To telemetry floor cardiology neurology consult IV fluids ordered blood cultures urine cultures and urine drug screen      ________________________________________________________________________    Signed: Barber Casillas MD

## 2022-10-25 NOTE — PROGRESS NOTES
PROGRESS NOTE    Patient: Christen Shrestha MRN: [id number]  SSN: [id number]    YOB: 1975  Age: 49 y.o. Sex: female      Admit Date: 10/24/2022    LOS: 1 day       Subjective     Chief Complaint   Patient presents with    Fall    Syncope       HPI:   Patient is a 49y.o. year old female history of hypertension history of endometrial carcinoma diagnosed in 2020 after multiple surgeries and chemotherapy  Last chemo was on Wednesday since then patient feeling very dizzy and today she passed out twice came to emergency room seen by the ER physician  ER physician tenderness most likely cardiac event patient was recommended to be admitted for further ration treatment and I saw the patient patient was very hypotensive systolic blood pressure was 80     Patient is alert awake denies any chest pain shortness of nausea vomiting according to patient before she passed out she felt dizzy and then she blacked out denies any chest pain diaphoresis palpitation nausea vomiting diarrhea constipation    10/25  Low BP at [id number] cardiology and neurology consults  Awaiting blood cx results  MRSA negative  Labs from 10/24:  Hgb low at 10  Sodium low at 133  Potassium low at 3.1  Chloride low at 96  Glucose elevated at 190  Creatinine elevated at 1.36  Pt is alert, awake, no acute distress. Pt states she is feeling tired with some weakness, but overall feels \"okay. \" She states she started a fluid pill last week for leg swelling, unsure of name at this time. Denies any dizziness, lightheadedness, chest pain or SOB. Denies N/V/D, constipation, abdominal pain, fever or chills.      Objective     Visit Vitals  BP (!) 87/57 (BP 1 Location: Right upper arm, BP Patient Position: Lying)   Pulse 79   Temp 97.7 °F (36.5 °C)   Resp 18   Ht 5' 2\" (1.575 m)   Wt 147 kg (324 lb)   SpO2 98%   Breastfeeding No   BMI 59.26 kg/m²      O2 Device: None (Room air)    Physical Exam:   Constitutional: pt is oriented to person, place, and time.   HENT:   Head: Normocephalic and atraumatic. Eyes: Pupils are equal, round, and reactive to light. EOM are normal.   Cardiovascular: Normal rate, regular rhythm and normal heart sounds. Pulmonary/Chest: Breath sounds normal. No wheezes. No rales. Exhibits no tenderness. Abdominal: Soft. Bowel sounds are normal. There is no abdominal tenderness. There is no rebound and no guarding. Musculoskeletal: Normal range of motion. B/L LE edema  Neurological: pt is alert and oriented to person, place, and time. Alert. Normal strength. No cranial nerve deficit or sensory deficit. Displays a negative Romberg sign. Intake & Output:  Current Shift: No intake/output data recorded.   Last three shifts: 10/23 1901 - 10/25 0700  In: 1000 [I.V.:1000]  Out: -       24 Hour Results:    Recent Results (from the past 24 hour(s))   EKG, 12 LEAD, INITIAL    Collection Time: 10/24/22  3:36 PM   Result Value Ref Range    Ventricular Rate 78 BPM    Atrial Rate 78 BPM    P-R Interval 148 ms    QRS Duration 82 ms    Q-T Interval 368 ms    QTC Calculation (Bezet) 419 ms    Calculated P Axis 50 degrees    Calculated R Axis -20 degrees    Calculated T Axis 44 degrees    Diagnosis       Normal sinus rhythm with sinus arrhythmia  Possible Anterior infarct , age undetermined  Abnormal ECG  No previous ECGs available  Confirmed by Gaurav Slater ([id number]) on 10/24/2022 9:42:51 PM     CBC WITH AUTOMATED DIFF    Collection Time: 10/24/22  4:34 PM   Result Value Ref Range    WBC 8.6 3.6 - 11.0 K/uL    RBC 3.30 (L) 3.80 - 5.20 M/uL    HGB 10.0 (L) 11.5 - 16.0 g/dL    HCT 31.1 (L) 35.0 - 47.0 %    MCV 94.2 80.0 - 99.0 FL    MCH 30.3 26.0 - 34.0 PG    MCHC 32.2 30.0 - 36.5 g/dL    RDW 21.2 (H) 11.5 - 14.5 %    PLATELET 854 948 - 827 K/uL    MPV 10.5 8.9 - 12.9 FL    NRBC 0.0 0.0  WBC    ABSOLUTE NRBC 0.00 0.00 - 0.01 K/uL    NEUTROPHILS 68 32 - 75 %    LYMPHOCYTES 25 12 - 49 %    MONOCYTES 4 (L) 5 - 13 %    EOSINOPHILS 1 0 - 7 % BASOPHILS 0 0 - 1 %    METAMYELOCYTES 2 (H) 0 %    IMMATURE GRANULOCYTES 0 %    ABS. NEUTROPHILS 5.8 1.8 - 8.0 K/UL    ABS. LYMPHOCYTES 2.2 0.8 - 3.5 K/UL    ABS. MONOCYTES 0.3 0.0 - 1.0 K/UL    ABS. EOSINOPHILS 0.1 0.0 - 0.4 K/UL    ABS. BASOPHILS 0.0 0.0 - 0.1 K/UL    ABS. IMM. GRANS. 0.0 K/UL    DF Manual      RBC COMMENTS Anisocytosis  1+        RBC COMMENTS Microcytosis  1+        RBC COMMENTS Spherocytes  1+       METABOLIC PANEL, BASIC    Collection Time: 10/24/22  4:34 PM   Result Value Ref Range    Sodium 133 (L) 136 - 145 mmol/L    Potassium 3.1 (L) 3.5 - 5.1 mmol/L    Chloride 96 (L) 97 - 108 mmol/L    CO2 30 21 - 32 mmol/L    Anion gap 7 5 - 15 mmol/L    Glucose 190 (H) 65 - 100 mg/dL    BUN 30 (H) 6 - 20 mg/dL    Creatinine 1.36 (H) 0.55 - 1.02 mg/dL    BUN/Creatinine ratio 22 (H) 12 - 20      eGFR 48 (L) >60 ml/min/1.73m2    Calcium 8.5 8.5 - 10.1 mg/dL   MAGNESIUM    Collection Time: 10/24/22  4:34 PM   Result Value Ref Range    Magnesium 1.8 1.6 - 2.4 mg/dL   PHOSPHORUS    Collection Time: 10/24/22  4:34 PM   Result Value Ref Range    Phosphorus 4.1 2.6 - 4.7 mg/dL   NT-PRO BNP    Collection Time: 10/24/22  4:34 PM   Result Value Ref Range    NT pro-BNP 53 <125 pg/mL   TROPONIN-HIGH SENSITIVITY    Collection Time: 10/24/22  7:30 PM   Result Value Ref Range    Troponin-High Sensitivity 10 0 - 51 ng/L   MRSA SCREEN - PCR (NASAL)    Collection Time: 10/24/22  8:48 PM   Result Value Ref Range    MRSA by PCR, Nasal Not Detected Not Detected     COVID-19 RAPID TEST    Collection Time: 10/24/22  8:48 PM   Result Value Ref Range    COVID-19 rapid test Not Detected Not Detected           Imaging:    CT HEAD WO CONT   Final Result   No acute intracranial finding. XR KNEE LT 3 V   Final Result      No fracture.       MRI BRAIN W WO CONT    (Results Pending)          Assessment     Syncopal episode  Dehydration  Acute kidney injury  Endometrial carcinoma  Hypokalemia  Hyponatremia  Hypotension  B/L lower extremity edema    Plan     Repleat sodium and potassium  Increase IVF  Awaiting cardiology and neurology consults  Awaiting blood cx results      Current Facility-Administered Medications:     potassium chloride SR (KLOR-CON 10) tablet 10 mEq, 10 mEq, Oral, BID, Jordyn Wild MD    0.9% sodium chloride infusion, 75 mL/hr, IntraVENous, CONTINUOUS, Valeriy Padilla MD, Last Rate: 75 mL/hr at 10/24/22 2300, 75 mL/hr at 10/24/22 2300    acetaminophen (TYLENOL) tablet 650 mg, 650 mg, Oral, Q6H PRN **OR** acetaminophen (TYLENOL) suppository 650 mg, 650 mg, Rectal, Q6H PRN, Valeriy Padilla MD    polyethylene glycol (MIRALAX) packet 17 g, 17 g, Oral, DAILY PRN, Valeriy Padilla MD    ondansetron (ZOFRAN ODT) tablet 4 mg, 4 mg, Oral, Q8H PRN **OR** ondansetron (ZOFRAN) injection 4 mg, 4 mg, IntraVENous, Q6H PRN, Valeriy Padilla MD    heparin (porcine) injection 5,000 Units, 5,000 Units, SubCUTAneous, Q8H, Valeriy Padilla MD, 5,000 Units at 10/25/22 9769    Current Outpatient Medications   Medication Instructions    baclofen (LIORESAL) 10 mg tablet Oral, 3 TIMES DAILY    bumetanide (BUMEX) 2 mg, Oral, DAILY    butalbital-acetaminophen-caffeine (FIORICET, ESGIC) -40 mg per tablet 1 Tablet, Oral, EVERY 4 HOURS AS NEEDED    cholecalciferol (Vitamin D3) (1000 Units /25 mcg) tablet Oral, DAILY    DULoxetine (CYMBALTA) 60 mg, Oral, DAILY    gabapentin (NEURONTIN) 900 mg, Oral, 3 TIMES DAILY    ibuprofen (MOTRIN) 800 mg tablet Oral    lisinopriL (PRINIVIL, ZESTRIL) 5 mg, Oral, DAILY    LORazepam (ATIVAN) 1 mg, Oral, EVERY 8 HOURS AS NEEDED    ondansetron hcl (ZOFRAN) 8 mg, Oral, EVERY 8 HOURS AS NEEDED    pantoprazole (PROTONIX) 40 mg, Oral, DAILY    pyridoxine (vitamin B6) (VITAMIN B-6) 100 mg, Oral, DAILY         Signed By: Annika Wilkes MD     October 25, 2022

## 2022-10-25 NOTE — PROGRESS NOTES
Reason for Admission:  Syncope                     RUR Score: 12%                    Plan for utilizing home health:    Declines      PCP: First and Last name:  Enid Antonio MD     Name of Practice:    Are you a current patient: Yes/No:    Approximate date of last visit: a few months ago   Can you participate in a virtual visit with your PCP:                     Current Advanced Directive/Advance Care Plan: Full Code  CM confirmed with patient that he is a Full Code    Healthcare Decision Maker:   Click here to complete [id number] including selection of the Healthcare Decision Maker Relationship (ie \"Primary\")           , Briana Hurtado, [phone]                  Transition of Care Plan:                    CM spoke with patient at bedside. Patient lives at home with her . There are 3 steps to enter the home. Patient has a walker and cane as needed. Patient is independent in care. Patient's family is her transport. Patient uses the [facility] in [place]. Current Dispo: Home/self.

## 2022-10-30 LAB
BACTERIA SPEC CULT: NORMAL
SPECIAL REQUESTS,SREQ: NORMAL

## 2022-10-31 NOTE — PROGRESS NOTES
Physician Progress Note      PATIENT:               Fátima Green  CSN #:                  [id number]  :                       1975  ADMIT DATE:       10/24/2022 3:24 PM  DISCH DATE:        10/25/2022 3:35 PM  RESPONDING  PROVIDER #:        Jahaira Schwab MD          QUERY TEXT:    Dr. Carl Bradley,    Patient admitted with syncope. noted to have hypotension and dehydration. If possible, please document in progress notes and discharge summary after study the etiology of the syncope:]]      The medical record reflects the following:  Risk Factors: 49year old female with stage IV endometrial cancer on chemotherapy  Clinical Indicators: Na 133, K 3.1, creatinine 1.36, BUN 30  BP 98/61, 96/64  Hx stage IV endometrial Ca, last chemo   H&P - hypotensive, SBP 80, dehydration, LUCIA, hyponatremia, hypokalemia  Cardiology consult - Syncope, likely vasodepressor related to BP medications and overdiuresis  Treatment: IV fluids    Thank you,  Hiren Rothman RN, CRCR  [email or web address]  You can also reach me via [employer]. Options provided:  -- Syncope due to TIA  -- Syncope due to Stroke  -- Syncope due to other hypotension  -- Other - I will add my own diagnosis  -- Disagree - Not applicable / Not valid  -- Disagree - Clinically unable to determine / Unknown  -- Refer to Clinical Documentation Reviewer    PROVIDER RESPONSE TEXT:    The syncope is due to other hypotension. Query created by:  Klaus Rojas on 10/28/2022 12:10 PM      Electronically signed by:  Jahaira Schwab MD 10/31/2022 11:19 AM

## 2022-11-07 NOTE — DISCHARGE SUMMARY
Patient is a 49y.o. year old female history of hypertension history of endometrial carcinoma diagnosed in 2020 after multiple surgeries and chemotherapy  Last chemo was on Wednesday since then patient feeling very dizzy and today she passed out twice came to emergency room seen by the ER physician  ER physician tenderness most likely cardiac event patient was recommended to be admitted for further ration treatment and I saw the patient patient was very hypotensive systolic blood pressure was 80     Patient is alert awake denies any chest pain shortness of nausea vomiting according to patient before she passed out she felt dizzy and then she blacked out denies any chest pain diaphoresis palpitation nausea vomiting diarrhea constipation     10/25  Low BP at [id number] cardiology and neurology consults  Awaiting blood cx results  MRSA negative  Labs from 10/24:  Hgb low at 10  Sodium low at 133  Potassium low at 3.1  Chloride low at 96  Glucose elevated at 190  Creatinine elevated at 1.36  Pt is alert, awake, no acute distress. Pt states she is feeling tired with some weakness, but overall feels \"okay. \" She states she started a fluid pill last week for leg swelling, unsure of name at this time. Denies any dizziness, lightheadedness, chest pain or SOB. Denies N/V/D, constipation, abdominal pain, fever or chills.       A/p    Syncopal episode  Dehydration  Acute kidney injury  Endometrial carcinoma  Hypokalemia  Hyponatremia  Hypotension  B/L lower extremity edema      Patient blood pressure was too low patient receiving IV fluid seen by the cardiology and the neurology most likely hypovolemic dehydration patient signed AMA and left

## 2023-05-18 RX ORDER — BUMETANIDE 2 MG/1
2 TABLET ORAL DAILY
COMMUNITY

## 2023-05-18 RX ORDER — ONDANSETRON HYDROCHLORIDE 8 MG/1
8 TABLET, FILM COATED ORAL EVERY 8 HOURS PRN
COMMUNITY

## 2023-05-18 RX ORDER — GABAPENTIN 300 MG/1
900 CAPSULE ORAL 3 TIMES DAILY
COMMUNITY

## 2023-05-18 RX ORDER — DULOXETIN HYDROCHLORIDE 60 MG/1
60 CAPSULE, DELAYED RELEASE ORAL DAILY
COMMUNITY

## 2023-05-18 RX ORDER — PYRIDOXINE HCL (VITAMIN B6) 100 MG
100 TABLET ORAL DAILY
COMMUNITY

## 2023-05-18 RX ORDER — IBUPROFEN 800 MG/1
TABLET ORAL
COMMUNITY

## 2023-05-18 RX ORDER — LISINOPRIL 5 MG/1
5 TABLET ORAL DAILY
COMMUNITY

## 2023-05-18 RX ORDER — BUTALBITAL, ACETAMINOPHEN AND CAFFEINE 50; 325; 40 MG/1; MG/1; MG/1
1 TABLET ORAL EVERY 4 HOURS PRN
COMMUNITY

## 2023-05-18 RX ORDER — PANTOPRAZOLE SODIUM 40 MG/1
40 TABLET, DELAYED RELEASE ORAL DAILY
COMMUNITY

## 2023-05-18 RX ORDER — LORAZEPAM 1 MG/1
1 TABLET ORAL EVERY 8 HOURS PRN
COMMUNITY

## 2023-05-18 RX ORDER — BACLOFEN 10 MG/1
TABLET ORAL 3 TIMES DAILY
COMMUNITY